# Patient Record
Sex: FEMALE | Employment: FULL TIME | URBAN - METROPOLITAN AREA
[De-identification: names, ages, dates, MRNs, and addresses within clinical notes are randomized per-mention and may not be internally consistent; named-entity substitution may affect disease eponyms.]

---

## 2020-10-26 ENCOUNTER — TRANSFERRED RECORDS (OUTPATIENT)
Dept: HEALTH INFORMATION MANAGEMENT | Facility: CLINIC | Age: 50
End: 2020-10-26

## 2020-10-26 ENCOUNTER — HOSPITAL ENCOUNTER (EMERGENCY)
Facility: CLINIC | Age: 50
Discharge: HOME | End: 2020-10-26
Attending: EMERGENCY MEDICINE | Admitting: EMERGENCY MEDICINE
Payer: COMMERCIAL

## 2020-10-26 VITALS
SYSTOLIC BLOOD PRESSURE: 141 MMHG | WEIGHT: 276 LBS | HEART RATE: 94 BPM | OXYGEN SATURATION: 98 % | HEIGHT: 67 IN | DIASTOLIC BLOOD PRESSURE: 97 MMHG | RESPIRATION RATE: 20 BRPM | TEMPERATURE: 98.9 F | BODY MASS INDEX: 43.32 KG/M2

## 2020-10-26 DIAGNOSIS — N20.1 URETEROLITHIASIS: ICD-10-CM

## 2020-10-26 LAB
ANION GAP SERPL CALCULATED.3IONS-SCNC: 9 MMOL/L (ref 3–14)
BUN SERPL-MCNC: 13 MG/DL (ref 7–30)
CALCIUM SERPL-MCNC: 8.7 MG/DL (ref 8.5–10.1)
CHLORIDE SERPL-SCNC: 109 MMOL/L (ref 94–109)
CO2 SERPL-SCNC: 24 MMOL/L (ref 20–32)
CREAT SERPL-MCNC: 0.73 MG/DL (ref 0.52–1.04)
GFR SERPL CREATININE-BSD FRML MDRD: >90 ML/MIN/{1.73_M2}
GLUCOSE SERPL-MCNC: 102 MG/DL (ref 70–99)
POTASSIUM SERPL-SCNC: 3.4 MMOL/L (ref 3.4–5.3)
SODIUM SERPL-SCNC: 142 MMOL/L (ref 133–144)

## 2020-10-26 PROCEDURE — 99285 EMERGENCY DEPT VISIT HI MDM: CPT | Mod: 25

## 2020-10-26 PROCEDURE — 258N000003 HC RX IP 258 OP 636: Performed by: EMERGENCY MEDICINE

## 2020-10-26 PROCEDURE — 250N000011 HC RX IP 250 OP 636: Performed by: EMERGENCY MEDICINE

## 2020-10-26 PROCEDURE — 80048 BASIC METABOLIC PNL TOTAL CA: CPT | Performed by: EMERGENCY MEDICINE

## 2020-10-26 PROCEDURE — 96361 HYDRATE IV INFUSION ADD-ON: CPT

## 2020-10-26 PROCEDURE — 96375 TX/PRO/DX INJ NEW DRUG ADDON: CPT

## 2020-10-26 PROCEDURE — 96374 THER/PROPH/DIAG INJ IV PUSH: CPT

## 2020-10-26 RX ORDER — HYDROMORPHONE HYDROCHLORIDE 1 MG/ML
0.5 INJECTION, SOLUTION INTRAMUSCULAR; INTRAVENOUS; SUBCUTANEOUS ONCE
Status: COMPLETED | OUTPATIENT
Start: 2020-10-26 | End: 2020-10-26

## 2020-10-26 RX ORDER — ONDANSETRON 2 MG/ML
4 INJECTION INTRAMUSCULAR; INTRAVENOUS ONCE
Status: DISCONTINUED | OUTPATIENT
Start: 2020-10-26 | End: 2020-10-26

## 2020-10-26 RX ORDER — TAMSULOSIN HYDROCHLORIDE 0.4 MG/1
0.4 CAPSULE ORAL DAILY
Qty: 10 CAPSULE | Refills: 0 | Status: SHIPPED | OUTPATIENT
Start: 2020-10-26 | End: 2020-11-05

## 2020-10-26 RX ORDER — KETOROLAC TROMETHAMINE 15 MG/ML
15 INJECTION, SOLUTION INTRAMUSCULAR; INTRAVENOUS ONCE
Status: COMPLETED | OUTPATIENT
Start: 2020-10-26 | End: 2020-10-26

## 2020-10-26 RX ORDER — MORPHINE SULFATE 15 MG/1
15-30 TABLET ORAL EVERY 4 HOURS PRN
Qty: 15 TABLET | Refills: 0 | Status: SHIPPED | OUTPATIENT
Start: 2020-10-26

## 2020-10-26 RX ORDER — ONDANSETRON 2 MG/ML
4 INJECTION INTRAMUSCULAR; INTRAVENOUS ONCE
Status: COMPLETED | OUTPATIENT
Start: 2020-10-26 | End: 2020-10-26

## 2020-10-26 RX ORDER — ONDANSETRON 4 MG/1
4 TABLET, ORALLY DISINTEGRATING ORAL EVERY 8 HOURS PRN
Qty: 10 TABLET | Refills: 0 | Status: SHIPPED | OUTPATIENT
Start: 2020-10-26 | End: 2020-10-29

## 2020-10-26 RX ADMIN — SODIUM CHLORIDE 1000 ML: 9 INJECTION, SOLUTION INTRAVENOUS at 18:27

## 2020-10-26 RX ADMIN — KETOROLAC TROMETHAMINE 15 MG: 15 INJECTION, SOLUTION INTRAMUSCULAR; INTRAVENOUS at 18:39

## 2020-10-26 RX ADMIN — HYDROMORPHONE HYDROCHLORIDE 0.5 MG: 1 INJECTION, SOLUTION INTRAMUSCULAR; INTRAVENOUS; SUBCUTANEOUS at 18:39

## 2020-10-26 RX ADMIN — ONDANSETRON 4 MG: 2 INJECTION INTRAMUSCULAR; INTRAVENOUS at 18:27

## 2020-10-26 ASSESSMENT — ENCOUNTER SYMPTOMS
HEMATURIA: 1
FLANK PAIN: 1
FEVER: 0
BACK PAIN: 1

## 2020-10-26 ASSESSMENT — MIFFLIN-ST. JEOR: SCORE: 1904.56

## 2020-10-26 NOTE — ED AVS SNAPSHOT
[facility] Emergency Dept  [address] [address]  Phone: [phone]  Fax: [phone]                                    Jonna Vilchis   MRN: [id number]    Department: [facility] Emergency Dept   Date of Visit: 10/26/2020           After Visit Summary Signature Page    I have received my discharge instructions, and my questions have been answered. I have discussed any challenges I see with this plan with the nurse or doctor.    ..........................................................................................................................................  Patient/Patient Representative Signature      ..........................................................................................................................................  Patient Representative Print Name and Relationship to Patient    ..................................................               ................................................  Date                                   Time    ..........................................................................................................................................  Reviewed by Signature/Title    ...................................................              ..............................................  Date                                               Time          22EPIC Rev 08/18

## 2020-10-26 NOTE — ED TRIAGE NOTES
Patient coming from [facility] and diagnosed with a 7mm left kidney stone. Patient reports pain began at 1am with n/v. Pt brought CT scan on a disc from clinic. Patient has not taken anything for pain pta or at the clinic. Urine and lab work was done pta. Pt reports she was sent here d/t the stone being so large, needs to see urology with possible admission.

## 2020-10-26 NOTE — ED PROVIDER NOTES
History     Chief Complaint:  Flank Pain    HPI  Jonna Vilchis is a 50 year old female who presents for evaluation of left sided flank pain. This pain woke her from sleep at 0100 this morning and she rates it as 8/10 on exam here. She began vomited when her pain began, a total of 4 times, and then was able to have some bland foods this afternoon. She went to her primary clinic in [place] and was diagnosed with a 7mm left kidney stone. The NP in clinic was worried about her pain so referred the patient to the ED. She has not yet had any medications for her pain.     Laboratory and Imaging work from clinic today:  CBC: (WBC 14.0, HGB 13.3, )   UA: specific gravity 1.025 (H), pH 6.0 (L), RBC 25-50, bacteria few o/w Negative    CT Abdomen/Pelvis wo contrast stone protocol  Impression:  1. 7mm obstructing stone at the left UVJ with moderate upstream left hydronephrosis.  2. Diffuse hepatic stenosis  3. previously seen liver lesions on MRI not seen well  4. Small benign appearing nodular density along the lateral aspect of the right hepatic lobe  5. Few small noncalcified pulmonary nodules in the right lung base measuring up to 3mm.      Allergies:  Codeine   Penicillins  Sulfa Antibiotics    Medications:    Hydrochlorothiazide   Losartan  Atenolol  Rizatriptan  Sumatriptan    Past Medical History:    Acanthosis nigricans   Calculus of gallbladder  Chronic epigastric pain  Chronic mid back pain  Edema  GERD  Migraines  HTN  Liver masses  Overweight  Sleep apnea  Tonsillar hypertrophy  Asthma    Past Surgical History:    Adenoidectomy   Ear tubes    Family History:    Hypertension in her mother and father  Prostate cancer in her father  Type II diabetes in her father  Chron's disease in her sister    Social History:  The patient arrives alone  Smoking: never smoker  Alcohol use: yes, socially   PCP: Gael Broussard  Marital Status:  [2]      Review of Systems   Constitutional: Negative for fever.  "  Genitourinary: Positive for flank pain and hematuria.   Musculoskeletal: Positive for back pain.   All other systems reviewed and are negative.      Physical Exam     Patient Vitals for the past 24 hrs:   BP Temp Temp src Pulse Resp SpO2 Height Weight   10/26/20 1850 -- -- -- -- -- 92 % -- --   10/26/20 1845 (!) 141/97 -- -- 94 -- 90 % -- --   10/26/20 1703 (!) 180/95 98.9  F (37.2  C) Oral 111 20 97 % 1.702 m (5' 7\") 125.2 kg (276 lb)     Physical Exam  Eye:  Pupils are equal, round, and reactive.  Extraocular movements intact.    ENT:  No rhinorrhea.  Moist mucus membranes.  Normal tongue and tonsil.    Cardiac:  Regular rate and rhythm.  No murmurs, gallops, or rubs.    Pulmonary:  Clear to auscultation bilaterally.  No wheezes, rales, or rhonchi.    Abdomen:  Positive bowel sounds.  Abdomen is soft and non-distended. Despite complaints of left flank pain, palpitation does no exacerbate discomfort.     Musculoskeletal:  Normal movement of all extremities without evidence for deficit.    Skin:  Warm and dry without rashes.    Neurologic:  Non-focal exam without asymmetric weakness or numbness.     Psychiatric:  Normal affect with appropriate interaction with examiner.      Emergency Department Course     Laboratory:  Laboratory findings were communicated with the patient who voiced understanding of the findings.    BMP: Glucose 102 (H), o/w WNL (Creatinine: 0.73)    Interventions:  1827 NS 1L IV Bolus  1827 Zofran 4mg IV injection   1839 Toradol, 15 mg, IV  1839 Dilaudid 0.5mg IV injection    Emergency Department Course:  Past medical records, nursing notes, and vitals reviewed.  1748: I performed an exam of the patient and obtained history, as documented above.     IV was inserted and blood was drawn for laboratory testing, results above.    1941: I rechecked the patient. Explained findings to patient who feels her pain is better controlled after medications.    I personally reviewed the laboratory results with " the Patient and answered all related questions prior to discharge.     Findings and plan explained to the Patient. Patient discharged home with instructions regarding supportive care, medications, and reasons to return. The importance of close follow-up was reviewed.   Impression & Plan      Medical Decision Making:  Jonna Vilchis is a 50 year old female who presents to the emergency department today for evaluation of classic symptoms of ureterolithiasis.  This is confirmed on CT from the out side facility.  However, they were concerned with her 7 mm stone along with hydronephrosis and transferred her by ambulance to our facility.    On my initial assessment, the patient appears clinically well.  She has some mild discomfort, completely improved with our above interventions.  Chemistry panel was sent which is normal.  Considering that she has had the stone completely traverse her entire ureteral tract over a period of 12 to 16 hours, currently at the UVJ, I feel that she is a highly likely candidate for complete resolution on her own without intervention.  She does not require urologic evaluation in the hospital.  With her pain completely resolved, we will plan to discharge her home on Flomax along with pain and nausea medication.  She was advised to use Motrin and Tylenol.  She will follow-up with urology if she does not have resolution of her discomfort over the next 24 to 48 hours.  Otherwise, she will return to us for any worsening of her condition, vomiting, fever, or other emergent concerns.    The outside facility performed a very thorough investigation with reassuring urinalysis.  There are no concerns for infected stone.    Diagnosis:    ICD-10-CM   1. Ureterolithiasis  N20.1       Disposition:   Discharged to home.    Discharge Medications:     Medication List      Started    morphine 15 MG IR tablet  Commonly known as: MSIR  15-30 mg, Oral, EVERY 4 HOURS PRN     ondansetron 4 MG ODT tab  Commonly  known as: Zofran ODT  4 mg, Oral, EVERY 8 HOURS PRN     tamsulosin 0.4 MG capsule  Commonly known as: Flomax  0.4 mg, Oral, DAILY            Scribe Disclosure:  I, Delia Mcgraw, am serving as a scribe at 5:48 PM on 10/26/2020 to document services personally performed by Trierweiler, Chad A, MD based on my observations and the provider's statements to me.    [facility] EMERGENCY DEPT     Trierweiler, Chad A, MD  10/27/20 0032

## 2020-10-29 NOTE — TELEPHONE ENCOUNTER
MEDICAL RECORDS REQUEST   [facility]  Urology Clinic  [address] [place] [address]  PHONE: [phone]  Fax: [phone]        FUTURE VISIT INFORMATION                                                   Jonna Vilchis, : 1970 scheduled for future visit at [facility]    APPOINTMENT INFORMATION:    Date: 20 1:15PM    Provider:  Abbi Joaquin MD    Reason for Visit/Diagnosis: Kidney stones     REFERRAL INFORMATION:    Referring provider:  N/A    Specialty: N/A    Referring providers clinic:  ED     Clinic contact number:  N/A    RECORDS REQUESTED FOR VISIT                                                     NOTES  STATUS/DETAILS   OFFICE NOTE from referring provider  yes   OFFICE NOTE from other specialist  no   DISCHARGE SUMMARY from hospital  yes   DISCHARGE REPORT from the ER  yes   OPERATIVE REPORT  no   MEDICATION LIST  yes     PRE-VISIT CHECKLIST      Record collection complete Yes- Internal recs in epic   Appointment appropriately scheduled           (right time/right provider) Yes   MyChart activation Yes   Questionnaire complete If no, please explain: In process     Completed by: Tash Thomas

## 2020-11-02 ENCOUNTER — DOCUMENTATION ONLY (OUTPATIENT)
Dept: CARE COORDINATION | Facility: CLINIC | Age: 50
End: 2020-11-02

## 2020-11-02 ENCOUNTER — TELEPHONE (OUTPATIENT)
Dept: UROLOGY | Facility: CLINIC | Age: 50
End: 2020-11-02

## 2020-11-02 NOTE — TELEPHONE ENCOUNTER
Reason for Visit: Consult    Diagnosis: kidney stone    Orders/Procedures/Records: in system    Contact Patient: n/a    Rooming Requirements: david Nava LPN  11/02/20  1:03 PM

## 2020-11-02 NOTE — TELEPHONE ENCOUNTER
----- Message from Abbi Joaquin MD sent at 11/2/2020  1:02 PM CST -----  Regarding: RE: Stone Patient Ok?  Yes, for sure.  She should be seen asap  ----- Message -----  From: Jeniffer Nava LPN  Sent: 10/29/2020   8:50 AM CST  To: Abbi Joaquin MD  Subject: Stone Patient Ok?                                Checking with you on this patient.   7mm obstructing stone     Jeniffer Nava LPN

## 2020-11-04 ENCOUNTER — VIRTUAL VISIT (OUTPATIENT)
Dept: UROLOGY | Facility: CLINIC | Age: 50
End: 2020-11-04
Payer: COMMERCIAL

## 2020-11-04 ENCOUNTER — PRE VISIT (OUTPATIENT)
Dept: UROLOGY | Facility: CLINIC | Age: 50
End: 2020-11-04

## 2020-11-04 DIAGNOSIS — N20.0 NEPHROLITHIASIS: Primary | ICD-10-CM

## 2020-11-04 PROCEDURE — 99203 OFFICE O/P NEW LOW 30 MIN: CPT | Mod: 95 | Performed by: UROLOGY

## 2020-11-04 NOTE — PROGRESS NOTES
"Video Visit Technology for this patient: Alessandro Video Visit- Patient was left in waiting room    Jonna Vilchis is a 50 year old female who is being evaluated via a billable video visit.      The patient has been notified of following:     \"This video visit will be conducted via a call between you and your physician/provider. We have found that certain health care needs can be provided without the need for an in-person physical exam.  This service lets us provide the care you need with a video conversation.  If a prescription is necessary we can send it directly to your pharmacy.  If lab work is needed we can place an order for that and you can then stop by our lab to have the test done at a later time.    Video visits are billed at different rates depending on your insurance coverage.  Please reach out to your insurance provider with any questions.    If during the course of the call the physician/provider feels a video visit is not appropriate, you will not be charged for this service.\"    Patient has given verbal consent for Video visit? Yes  How would you like to obtain your AVS? MyChart  If you are dropped from the video visit, the video invite should be resent to: Send to e-mail at: [email or web address]  Will anyone else be joining your video visit? No        Video-Visit Details    Type of service:  Video Visit    Video Start Time: 1:25 PM  Video End Time: 1:33 PM    Originating Location (pt. Location): Home    Distant Location (provider location):  [facility] UROLOGY [facility]     Platform used for Video Visit: Alessandro Joaquin MD        "

## 2020-11-04 NOTE — PATIENT INSTRUCTIONS
-stay well hydrated, keeping fluids above 2 L per day  -limit salt intake  -drink limeade  -f/u prn

## 2020-11-04 NOTE — LETTER
"11/4/2020       RE: Jonna Vilchis  [address] [address]     Dear Colleague,    Thank you for referring your patient, Jonna Vilchis, to the [facility]. Please see a copy of my visit note below.    Video Visit Technology for this patient: Alessandro Video Visit- Patient was left in waiting room    Jonna Vilchis is a 50 year old female who is being evaluated via a billable video visit.      The patient has been notified of following:     \"This video visit will be conducted via a call between you and your physician/provider. We have found that certain health care needs can be provided without the need for an in-person physical exam.  This service lets us provide the care you need with a video conversation.  If a prescription is necessary we can send it directly to your pharmacy.  If lab work is needed we can place an order for that and you can then stop by our lab to have the test done at a later time.    Video visits are billed at different rates depending on your insurance coverage.  Please reach out to your insurance provider with any questions.    If during the course of the call the physician/provider feels a video visit is not appropriate, you will not be charged for this service.\"    Patient has given verbal consent for Video visit? Yes  How would you like to obtain your AVS? MyChart  If you are dropped from the video visit, the video invite should be resent to: Send to e-mail at: [email or web address]  Will anyone else be joining your video visit? No        Video-Visit Details    Type of service:  Video Visit    Video Start Time: 1:25 PM  Video End Time: 1:33 PM    Originating Location (pt. Location): Home    Distant Location (provider location):  [facility] UROLOGY [facility]     Platform used for Video Visit: Alessandro Joaquin MD          CC: Nephrolithiasis    HPI:  Jonna Barfield " Mame is a 50 year old female asked to be seen in consultation by Dr. Broussard for the above.  This problem started on 10/26/20 when she had pain and nausea.  She presented to the ED and was found to have a 7 mm left UVJ stone.  However since leaving the ED her pain has resolved.  She strained her urine for 1 day but then had to go back to work.  She took Tamsulosin for 2 days and did not require any pain meds.  She has been doing very well.    She denies any dysuria,  hematuria, hesitancy, intermittency, feeling of incomplete emptying, or any recent hx of UTI's.  This was her first stone.    History reviewed. No pertinent past medical history.    History reviewed. No pertinent surgical history.    Meds, Allergies, FHx and SHx reviewed per nurse's intake note.    ROS is negative on a 14 point scale except for migraines.  All other positive and pertinent information is mentioned in the HPI.    PEx:   There were no vitals taken for this visit.  Data Unavailable, There is no height or weight on file to calculate BMI., 0 lbs 0 oz  Gen appearance:  Well groomed  HEENT:  EOMI, AT NC  Psych:  Normal Affect  Neuro:  A/O X 3  Skin:  Well perfused  Resp:  No increased respiratory effort  Musk:  Full ROM in extremities  :  deferred      ASSESSMENT and PLAN:  This is a 50 year old female with first time nephrolithiasis of 7 mm on the left side, passed spontaneously.  Different management options were discussed with the patient including observation, work up, or behavioral.  Pt. Would like to just observe.  -stay well hydrated, keeping fluids above 2 L per day  -limit salt intake  -drink limeade  -f/u prn    Thank you for allowing me to participate in Ms. Vilchis's care.  I will keep you updated on her progress.    Abbi Joaquin MD

## 2020-11-04 NOTE — PROGRESS NOTES
CC: Nephrolithiasis    HPI:  Jonna Vilchis is a 50 year old female asked to be seen in consultation by Dr. Broussard for the above.  This problem started on 10/26/20 when she had pain and nausea.  She presented to the ED and was found to have a 7 mm left UVJ stone.  However since leaving the ED her pain has resolved.  She strained her urine for 1 day but then had to go back to work.  She took Tamsulosin for 2 days and did not require any pain meds.  She has been doing very well.    She denies any dysuria,  hematuria, hesitancy, intermittency, feeling of incomplete emptying, or any recent hx of UTI's.  This was her first stone.    History reviewed. No pertinent past medical history.    History reviewed. No pertinent surgical history.    Meds, Allergies, FHx and SHx reviewed per nurse's intake note.    ROS is negative on a 14 point scale except for migraines.  All other positive and pertinent information is mentioned in the HPI.    PEx:   There were no vitals taken for this visit.  Data Unavailable, There is no height or weight on file to calculate BMI., 0 lbs 0 oz  Gen appearance:  Well groomed  HEENT:  EOMI, AT NC  Psych:  Normal Affect  Neuro:  A/O X 3  Skin:  Well perfused  Resp:  No increased respiratory effort  Musk:  Full ROM in extremities  :  deferred      ASSESSMENT and PLAN:  This is a 50 year old female with first time nephrolithiasis of 7 mm on the left side, passed spontaneously.  Different management options were discussed with the patient including observation, work up, or behavioral.  Pt. Would like to just observe.  -stay well hydrated, keeping fluids above 2 L per day  -limit salt intake  -drink limeade  -f/u prn    Thank you for allowing me to participate in Ms. Vilchis's care.  I will keep you updated on her progress.    Abbi Joaquin MD

## 2021-01-09 ENCOUNTER — HEALTH MAINTENANCE LETTER (OUTPATIENT)
Age: 51
End: 2021-01-09

## 2021-10-11 ENCOUNTER — HEALTH MAINTENANCE LETTER (OUTPATIENT)
Age: 51
End: 2021-10-11

## 2022-01-30 ENCOUNTER — HEALTH MAINTENANCE LETTER (OUTPATIENT)
Age: 52
End: 2022-01-30

## 2022-09-24 ENCOUNTER — HEALTH MAINTENANCE LETTER (OUTPATIENT)
Age: 52
End: 2022-09-24

## 2023-05-08 ENCOUNTER — HEALTH MAINTENANCE LETTER (OUTPATIENT)
Age: 53
End: 2023-05-08